# Patient Record
Sex: FEMALE | Race: BLACK OR AFRICAN AMERICAN | Employment: FULL TIME | URBAN - METROPOLITAN AREA
[De-identification: names, ages, dates, MRNs, and addresses within clinical notes are randomized per-mention and may not be internally consistent; named-entity substitution may affect disease eponyms.]

---

## 2023-09-26 PROBLEM — L28.0 LICHEN SIMPLEX CHRONICUS: Status: ACTIVE | Noted: 2021-12-21

## 2023-09-26 PROBLEM — I10 BENIGN HYPERTENSION: Status: ACTIVE | Noted: 2023-09-26

## 2023-09-26 PROBLEM — M20.5X2 CONTRACTURE OF TOE OF LEFT FOOT: Status: ACTIVE | Noted: 2023-09-26

## 2023-09-26 PROBLEM — R20.8 BURNING SENSATION OF FOOT: Status: ACTIVE | Noted: 2023-09-26

## 2023-09-26 PROBLEM — H04.123 DRY EYES: Status: ACTIVE | Noted: 2023-09-26

## 2023-09-26 PROBLEM — N39.0 UTI (URINARY TRACT INFECTION): Status: ACTIVE | Noted: 2023-09-26

## 2023-09-26 PROBLEM — L21.8 OTHER SEBORRHEIC DERMATITIS: Status: ACTIVE | Noted: 2021-12-21

## 2023-09-26 PROBLEM — L66.9 CICATRICIAL ALOPECIA, UNSPECIFIED: Status: ACTIVE | Noted: 2021-12-21

## 2023-09-26 PROBLEM — N32.89 BLADDER WALL THICKENING: Status: ACTIVE | Noted: 2023-09-26

## 2023-09-26 PROBLEM — B35.1 TINEA UNGUIUM: Status: ACTIVE | Noted: 2021-12-21

## 2023-09-26 PROBLEM — R39.81 FUNCTIONAL INCONTINENCE: Status: ACTIVE | Noted: 2023-09-26

## 2023-09-26 PROBLEM — L73.1 PSEUDOFOLLICULITIS BARBAE: Status: ACTIVE | Noted: 2021-12-21

## 2023-09-26 PROBLEM — H52.00 HYPEROPIA WITH PRESBYOPIA: Status: ACTIVE | Noted: 2023-09-26

## 2023-09-26 PROBLEM — N39.41 URGE INCONTINENCE OF URINE: Status: ACTIVE | Noted: 2023-09-26

## 2023-09-26 PROBLEM — H52.203 ASTIGMATISM OF BOTH EYES: Status: ACTIVE | Noted: 2023-09-26

## 2023-09-26 PROBLEM — E55.9 MILD VITAMIN D DEFICIENCY: Status: ACTIVE | Noted: 2023-09-26

## 2023-09-26 PROBLEM — D22.70 MELANOCYTIC NEVI OF UNSPECIFIED LOWER LIMB, INCLUDING HIP: Status: ACTIVE | Noted: 2021-12-21

## 2023-09-26 PROBLEM — H04.123 DRY EYE SYNDROME OF BOTH LACRIMAL GLANDS: Status: ACTIVE | Noted: 2023-09-26

## 2023-09-26 PROBLEM — R32 URINARY INCONTINENCE IN FEMALE: Status: ACTIVE | Noted: 2023-09-26

## 2023-09-26 PROBLEM — H52.03 HYPEROPIA OF BOTH EYES: Status: ACTIVE | Noted: 2023-09-26

## 2023-09-26 PROBLEM — H52.4 HYPEROPIA WITH PRESBYOPIA: Status: ACTIVE | Noted: 2023-09-26

## 2023-10-12 ENCOUNTER — APPOINTMENT (OUTPATIENT)
Dept: OBSTETRICS AND GYNECOLOGY | Facility: CLINIC | Age: 63
End: 2023-10-12
Payer: COMMERCIAL

## 2023-10-12 RX ORDER — HYDROCORTISONE 25 MG/G
OINTMENT TOPICAL
COMMUNITY
Start: 2017-03-13

## 2023-10-12 RX ORDER — CICLOPIROX 80 MG/ML
SOLUTION TOPICAL
COMMUNITY
Start: 2022-12-28

## 2023-10-12 RX ORDER — CLOBETASOL PROPIONATE 0.5 MG/G
OINTMENT TOPICAL
COMMUNITY
Start: 2022-12-29

## 2023-10-12 RX ORDER — FLUOCINONIDE 0.5 MG/G
CREAM TOPICAL
COMMUNITY
Start: 2019-02-19

## 2023-10-20 ENCOUNTER — APPOINTMENT (OUTPATIENT)
Dept: RADIOLOGY | Facility: HOSPITAL | Age: 63
End: 2023-10-20
Payer: COMMERCIAL

## 2023-10-23 ENCOUNTER — APPOINTMENT (OUTPATIENT)
Dept: ORTHOPEDIC SURGERY | Facility: HOSPITAL | Age: 63
End: 2023-10-23

## 2023-10-23 ENCOUNTER — APPOINTMENT (OUTPATIENT)
Dept: RADIOLOGY | Facility: HOSPITAL | Age: 63
End: 2023-10-23

## 2023-11-06 ENCOUNTER — OFFICE VISIT (OUTPATIENT)
Dept: PRIMARY CARE | Facility: CLINIC | Age: 63
End: 2023-11-06
Payer: COMMERCIAL

## 2023-11-06 VITALS
SYSTOLIC BLOOD PRESSURE: 154 MMHG | DIASTOLIC BLOOD PRESSURE: 94 MMHG | OXYGEN SATURATION: 97 % | RESPIRATION RATE: 18 BRPM | BODY MASS INDEX: 26.46 KG/M2 | HEIGHT: 64 IN | WEIGHT: 155 LBS | HEART RATE: 68 BPM

## 2023-11-06 DIAGNOSIS — E55.9 MILD VITAMIN D DEFICIENCY: ICD-10-CM

## 2023-11-06 DIAGNOSIS — Z12.31 ENCOUNTER FOR SCREENING MAMMOGRAM FOR MALIGNANT NEOPLASM OF BREAST: ICD-10-CM

## 2023-11-06 DIAGNOSIS — Z12.12 ENCOUNTER FOR COLORECTAL CANCER SCREENING: ICD-10-CM

## 2023-11-06 DIAGNOSIS — Z12.11 ENCOUNTER FOR COLORECTAL CANCER SCREENING: ICD-10-CM

## 2023-11-06 DIAGNOSIS — R39.81 FUNCTIONAL INCONTINENCE: ICD-10-CM

## 2023-11-06 DIAGNOSIS — I10 BENIGN HYPERTENSION: Primary | ICD-10-CM

## 2023-11-06 PROBLEM — L30.9 ECZEMA: Status: ACTIVE | Noted: 2023-11-06

## 2023-11-06 PROBLEM — R80.9 MICROALBUMINURIA: Status: ACTIVE | Noted: 2023-11-06

## 2023-11-06 PROBLEM — N32.81 OAB (OVERACTIVE BLADDER): Status: ACTIVE | Noted: 2019-11-20

## 2023-11-06 PROCEDURE — 3080F DIAST BP >= 90 MM HG: CPT

## 2023-11-06 PROCEDURE — 1036F TOBACCO NON-USER: CPT

## 2023-11-06 PROCEDURE — 99214 OFFICE O/P EST MOD 30 MIN: CPT

## 2023-11-06 PROCEDURE — 3077F SYST BP >= 140 MM HG: CPT

## 2023-11-06 RX ORDER — HYDROCHLOROTHIAZIDE 25 MG/1
25 TABLET ORAL DAILY
Qty: 90 TABLET | Refills: 3 | Status: SHIPPED | OUTPATIENT
Start: 2023-11-06

## 2023-11-06 RX ORDER — CALCIUM CARBONATE 750 MG/1
1 TABLET, CHEWABLE ORAL DAILY
Qty: 1 KIT | Refills: 0 | Status: SHIPPED | OUTPATIENT
Start: 2023-11-06

## 2023-11-06 RX ORDER — AMLODIPINE BESYLATE 5 MG/1
5 TABLET ORAL DAILY
Qty: 90 TABLET | Refills: 3 | Status: SHIPPED | OUTPATIENT
Start: 2023-11-06

## 2023-11-06 ASSESSMENT — ENCOUNTER SYMPTOMS
DIFFICULTY URINATING: 0
SLEEP DISTURBANCE: 0
FATIGUE: 0
DYSPHORIC MOOD: 0
SPEECH DIFFICULTY: 0
ANAL BLEEDING: 0
DIARRHEA: 0
COLOR CHANGE: 0
NEUROLOGICAL NEGATIVE: 1
CHILLS: 0
NAUSEA: 0
NUMBNESS: 0
GASTROINTESTINAL NEGATIVE: 1
PALPITATIONS: 0
HEMATURIA: 0
DIZZINESS: 0
CONFUSION: 0
POLYPHAGIA: 0
AGITATION: 0
RHINORRHEA: 0
RECTAL PAIN: 0
CONSTITUTIONAL NEGATIVE: 1
COUGH: 0
HEADACHES: 0
SHORTNESS OF BREATH: 0
BACK PAIN: 0
APPETITE CHANGE: 0
JOINT SWELLING: 0
DYSURIA: 0
EYES NEGATIVE: 1
SEIZURES: 0
SINUS PRESSURE: 0
WEAKNESS: 0
VOICE CHANGE: 0
NECK STIFFNESS: 0
DIAPHORESIS: 0
UNEXPECTED WEIGHT CHANGE: 0
ABDOMINAL DISTENTION: 0
ABDOMINAL PAIN: 0
CHEST TIGHTNESS: 0
FREQUENCY: 1
POLYDIPSIA: 0
PSYCHIATRIC NEGATIVE: 1
WHEEZING: 0
NERVOUS/ANXIOUS: 0
LIGHT-HEADEDNESS: 0
RESPIRATORY NEGATIVE: 1
ARTHRALGIAS: 1
FLANK PAIN: 0
HEMATOLOGIC/LYMPHATIC NEGATIVE: 1
TROUBLE SWALLOWING: 0
BLOOD IN STOOL: 0
ENDOCRINE NEGATIVE: 1
APNEA: 0
EYE DISCHARGE: 0
MYALGIAS: 0
CARDIOVASCULAR NEGATIVE: 1
TREMORS: 0
PHOTOPHOBIA: 0
STRIDOR: 0
BRUISES/BLEEDS EASILY: 0
FEVER: 0
CONSTIPATION: 0
HYPERACTIVE: 0
NECK PAIN: 0
SORE THROAT: 0
ACTIVITY CHANGE: 0

## 2023-11-06 NOTE — PROGRESS NOTES
Primary Care Provider: Leena Pack, APRN-CNP    Subjective   Camila Becker is a 63 y.o. female who presents for Follow-up (Bp medication).    History of high blood pressure and needs medication refilled.    Has not taken any BP medication for sometime now as she has been out of medication  HCTZ- usually takes this medication, has been out for a while now  Amlodipine- out for about 2 months     Some Right IT band pain; swimming helps pain     Function incontinence  Has been going on for 3.5 years now. Saw urology once.    No chest pain, no SOB, BM regular, energy level is good         Review of Systems   Constitutional: Negative.  Negative for activity change, appetite change, chills, diaphoresis, fatigue, fever and unexpected weight change.   HENT: Negative.  Negative for congestion, dental problem, ear discharge, ear pain, hearing loss, mouth sores, nosebleeds, postnasal drip, rhinorrhea, sinus pressure, sneezing, sore throat, tinnitus, trouble swallowing and voice change.    Eyes: Negative.  Negative for photophobia, discharge and visual disturbance.   Respiratory: Negative.  Negative for apnea, cough, chest tightness, shortness of breath, wheezing and stridor.    Cardiovascular: Negative.  Negative for chest pain, palpitations and leg swelling.   Gastrointestinal: Negative.  Negative for abdominal distention, abdominal pain, anal bleeding, blood in stool, constipation, diarrhea, nausea and rectal pain.   Endocrine: Negative.  Negative for cold intolerance, heat intolerance, polydipsia, polyphagia and polyuria.   Genitourinary:  Positive for frequency. Negative for decreased urine volume, difficulty urinating, dyspareunia, dysuria, enuresis, flank pain, genital sores, hematuria, menstrual problem, urgency, vaginal bleeding, vaginal discharge and vaginal pain.   Musculoskeletal:  Positive for arthralgias. Negative for back pain, gait problem, joint swelling, myalgias, neck pain and neck stiffness.   Skin:  "Negative.  Negative for color change and rash.   Neurological: Negative.  Negative for dizziness, tremors, seizures, syncope, speech difficulty, weakness, light-headedness, numbness and headaches.   Hematological: Negative.  Does not bruise/bleed easily.   Psychiatric/Behavioral: Negative.  Negative for agitation, confusion, dysphoric mood, sleep disturbance and suicidal ideas. The patient is not nervous/anxious and is not hyperactive.    All other systems reviewed and are negative.        Objective   BP (!) 154/94   Pulse 68   Resp 18   Ht 1.626 m (5' 4\")   Wt 70.3 kg (155 lb)   SpO2 97%   BMI 26.61 kg/m²     Physical Exam  Vitals reviewed.   Constitutional:       General: She is not in acute distress.     Appearance: Normal appearance. She is normal weight. She is not ill-appearing, toxic-appearing or diaphoretic.   HENT:      Head: Normocephalic and atraumatic.      Nose: Nose normal.   Eyes:      Extraocular Movements: Extraocular movements intact.      Conjunctiva/sclera: Conjunctivae normal.      Pupils: Pupils are equal, round, and reactive to light.   Cardiovascular:      Rate and Rhythm: Normal rate and regular rhythm.      Pulses: Normal pulses.      Heart sounds: Normal heart sounds. No murmur heard.     No friction rub. No gallop.   Pulmonary:      Effort: Pulmonary effort is normal. No respiratory distress.      Breath sounds: Normal breath sounds.   Abdominal:      General: Abdomen is flat. Bowel sounds are normal.      Palpations: Abdomen is soft.   Musculoskeletal:         General: Normal range of motion.      Cervical back: Normal range of motion and neck supple.   Skin:     General: Skin is warm and dry.      Capillary Refill: Capillary refill takes less than 2 seconds.   Neurological:      General: No focal deficit present.      Mental Status: She is alert and oriented to person, place, and time. Mental status is at baseline.   Psychiatric:         Mood and Affect: Mood normal.         " Behavior: Behavior normal.         Thought Content: Thought content normal.         Judgment: Judgment normal.         Assessment/Plan   Problem List Items Addressed This Visit       Hypertension  Uncontrolled  Restart medication- amlodipine, hydrochlorothiazide   -refilled HCTZ 25mg & amlodipine 5mg that she had been previous taking  -recheck labs: lipid panel, CMP, CBC, TSH, UA, A1c  would like her to start checking BP daily     Functional incontinence - Primary  Labs & UA    Mild vitamin D deficiency     Other Visit Diagnoses       Encounter for colorectal cancer screening        Encounter for screening mammogram for malignant neoplasm of breast            Mammogram ordered    Colonoscopy ordered    Blood work ordered- fasting    Get back in with OB/GYN    Get back in with Urology      Follow up in 4-6 weeks or sooner if needed

## 2023-11-07 ENCOUNTER — TELEPHONE (OUTPATIENT)
Dept: PRIMARY CARE | Facility: CLINIC | Age: 63
End: 2023-11-07
Payer: COMMERCIAL

## 2023-11-07 NOTE — TELEPHONE ENCOUNTER
Called the pt to inform of the denial of prior auth for self-taking blood pressure kit. Also stated I would inform of the prior auth for hydroCHLOROthiazide once I received response from insurance.

## 2024-01-18 ENCOUNTER — ANCILLARY PROCEDURE (OUTPATIENT)
Dept: RADIOLOGY | Facility: CLINIC | Age: 64
End: 2024-01-18
Payer: COMMERCIAL

## 2024-01-18 VITALS — BODY MASS INDEX: 26.46 KG/M2 | WEIGHT: 154.98 LBS | HEIGHT: 64 IN

## 2024-01-18 DIAGNOSIS — Z12.31 ENCOUNTER FOR SCREENING MAMMOGRAM FOR MALIGNANT NEOPLASM OF BREAST: ICD-10-CM

## 2024-01-18 PROCEDURE — 77067 SCR MAMMO BI INCL CAD: CPT | Performed by: RADIOLOGY

## 2024-01-18 PROCEDURE — 77063 BREAST TOMOSYNTHESIS BI: CPT | Performed by: RADIOLOGY

## 2024-01-18 PROCEDURE — 77063 BREAST TOMOSYNTHESIS BI: CPT

## 2024-01-22 ENCOUNTER — TELEPHONE (OUTPATIENT)
Dept: PRIMARY CARE | Facility: CLINIC | Age: 64
End: 2024-01-22
Payer: COMMERCIAL

## 2024-01-22 DIAGNOSIS — N63.10 MASS OF RIGHT BREAST, UNSPECIFIED QUADRANT: Primary | ICD-10-CM

## 2024-01-22 NOTE — TELEPHONE ENCOUNTER
Mailbox was full left a SMS notification with our number [phone].    Per TRUDY Leena Abnormal mammogram; right breast mass: Right breast US and right breast diagnostic mammogram ordered. Referral to breast clinic placed.

## 2024-01-25 ENCOUNTER — HOSPITAL ENCOUNTER (OUTPATIENT)
Dept: RADIOLOGY | Facility: CLINIC | Age: 64
Discharge: HOME | End: 2024-01-25
Payer: COMMERCIAL

## 2024-01-25 DIAGNOSIS — N63.10 MASS OF RIGHT BREAST, UNSPECIFIED QUADRANT: ICD-10-CM

## 2024-01-25 PROCEDURE — 77061 BREAST TOMOSYNTHESIS UNI: CPT | Mod: RT

## 2024-01-25 PROCEDURE — 76642 ULTRASOUND BREAST LIMITED: CPT | Mod: RT

## 2024-01-25 PROCEDURE — 76982 USE 1ST TARGET LESION: CPT | Mod: RT

## 2024-01-25 PROCEDURE — 76642 ULTRASOUND BREAST LIMITED: CPT | Mod: RIGHT SIDE | Performed by: RADIOLOGY

## 2024-01-25 PROCEDURE — 77061 BREAST TOMOSYNTHESIS UNI: CPT | Mod: RIGHT SIDE | Performed by: RADIOLOGY

## 2024-01-25 PROCEDURE — 77065 DX MAMMO INCL CAD UNI: CPT | Mod: RIGHT SIDE | Performed by: RADIOLOGY

## 2024-01-26 ENCOUNTER — TELEPHONE (OUTPATIENT)
Dept: PRIMARY CARE | Facility: CLINIC | Age: 64
End: 2024-01-26
Payer: COMMERCIAL

## 2024-01-26 DIAGNOSIS — N60.01 CYST OF RIGHT BREAST: Primary | ICD-10-CM

## 2024-01-26 NOTE — TELEPHONE ENCOUNTER
Per TRUDY Stern... Informed Patient probable complicated cyst in the right breast Plan: breast clinic referral 6 month follow up with repeat ultrasound

## 2024-07-23 NOTE — PROGRESS NOTES
Camila Becker female   1960 64 y.o.   [id number]      Chief Complaint  Right breast mass    History Of Present Illness  Camila Becker is a very pleasant 64 y.o. AA female [facility] by Leena Pack for right breast mass noted on mammogram in 2024. She has family history of breast cancer in her sister, age 45. She denies breast surgery or biopsy.    BREAST IMAGIN2024 Bilateral screening mammogram, indicates BI-RADS Category 0. Right breast mass warranting additional views. 2024 Right diagnostic mammogram and ultrasound, indicates BI-RADS Category 3. Right breast, probably benign mass warranting short term follow up.     REPRODUCTIVE HISTORY: menarche age 13, , first birth age 27, did not breastfeed, OCP's x 5 years, postmenopausal, no HRT, scattered fibroglandular tissue    FAMILY CANCER HISTORY:   Sister: Breast cancer, age 45, NO genetics     Surgical History  She has a past surgical history that includes Other surgical history (2022).     Social History  She reports that she has never smoked. She has never used smokeless tobacco. She reports current alcohol use. She reports that she does not use drugs.    Family History  Family History   Problem Relation Name Age of Onset    Diabetes Father      Hypertension Father      Breast cancer Sister          Allergies  Patient has no known allergies.    Medications  Current Outpatient Medications   Medication Instructions    amLODIPine (NORVASC) 5 mg, oral, Daily    blood pressure test kit-medium kit 1 kit, miscellaneous, Daily    ciclopirox (Penlac) 8 % solution     clobetasol (Temovate) 0.05 % ointment     fluocinonide 0.05 % cream 1 Application    hydroCHLOROthiazide (HYDRODIURIL) 25 mg, oral, Daily    hydrocortisone 2.5 % ointment Hydrocortisone 2.5 % External Ointment   Quantity: 56  Refills: 0        Start : 13-Mar-2017   Active    oxybutynin XL (Ditropan-XL) 10 mg 24 hr tablet 1 tablet, oral, Daily          REVIEW OF SYSTEMS    Constitutional:  Negative for appetite change, fatigue, fever and unexpected weight change.   HENT:  Negative for ear pain, hearing loss, nosebleeds, sore throat and trouble swallowing.    Eyes:  Negative for discharge, itching and visual disturbance.   Respiratory:  Negative for cough, chest tightness and shortness of breath.    Cardiovascular:  Negative for chest pain, palpitations and leg swelling.   Breast: as indicated in HPI  Gastrointestinal:  Negative for abdominal pain, constipation, diarrhea and nausea.   Endocrine: Negative for cold intolerance and heat intolerance.   Genitourinary:  Negative for dysuria, frequency, hematuria, pelvic pain and vaginal bleeding.   Musculoskeletal:  Negative for arthralgias, back pain, gait problem, joint swelling and myalgias.   Skin:  Negative for color change and rash.   Allergic/Immunologic: Negative for environmental allergies and food allergies.   Neurological:  Negative for dizziness, tremors, speech difficulty, weakness, numbness and headaches.   Hematological:  Does not bruise/bleed easily.   Psychiatric/Behavioral:  Negative for agitation, dysphoric mood and sleep disturbance. The patient is not nervous/anxious.         Past Medical History  She has a past medical history of Personal history of other diseases of the circulatory system.     Physical Exam  Patient is alert and oriented x3 and in a relaxed and appropriate mood. Her gait is steady and hand grasps are equal. Sclera is clear. The breasts are nearly symmetrical. The tissue is soft without palpable abnormalities, discrete nodules or masses. The skin and nipples appear normal. Both nipples are inverted, normal per patient report. There is no cervical, supraclavicular or axillary lymphadenopathy. Heart rate and rhythm normal, S1 and S2 appreciated. The lungs are clear to auscultation bilaterally. Abdomen is soft and non-tender.       Physical Exam     Last Recorded  Vitals  Vitals:    08/01/24 0909   BP: 122/72   Pulse: 70   SpO2: 99%       Relevant Results   Time was spent discussing digital images of the radiology testing with the patient. I explained the results in depth, along with suggested explanation for follow up recommendations based on the testing results. BI-RADS Category 2    Imaging    Narrative & Impression   Interpreted By:  Thu Barron,   STUDY:  BI US BREAST LIMITED RIGHT;  8/1/2024 9:45 am      ACCESSION NUMBER(S):  [id number]      ORDERING CLINICIAN:  PAUL RODRIGUEZ      INDICATION:  Short-term follow-up of a probably benign mass in the right breast.      COMPARISON:  01/25/2024.      FINDINGS:  ULTRASOUND: Targeted ultrasound was performed of the right breast by  a registered sonographer with elastography. At 3 o'clock 3 cm from  the nipple, there is redemonstration of an oval circumscribed  hypoechoic mass, which now measures 0.3 x 0.1 x 0.3 cm, previously  0.5 x 0.2 x 0.3 cm. There is no internal vascularity. It is soft on  elastography. Given the interval decrease in size, this is most  consistent with a benign etiology. Further sonographic evaluation of  the remainder of the medial breast demonstrates unremarkable  fibroglandular tissue.      IMPRESSION:  Interval decrease in size of the previously seen finding in the right  breast, consistent with a benign etiology. No further follow-up is  indicated. Patient is due for annual bilateral screening mammogram  01/2025.      BI-RADS CATEGORY:  BI-RADS Category:  2 Benign.  Recommendation:  Annual Screening.  Recommended Date:  5 Months.  Laterality:  Bilateral.       Assessment/Plan   Stable clinical exam and imaging, right breast stable mass, family history of breast cancer, scattered fibroglandular tissue    Plan: Return in January 2025 for bilateral screening mammogram and office visit. High risk evaluation at next visit.     Patient Discussion/Summary  Your clinical examination and imaging are  normal. Please return in January 2025 for bilateral screening mammogram and office visit or sooner if you have any problems or concerns. We will discuss high risk evaluation at your next visit.     You can see your health information, review clinical summaries from office visits & test results online when you follow your health with MY  Chart, a personal health record. To sign up go to [email or web address]. If you need assistance with signing up or trouble getting into your account call [employer] Patient Line 24/7 at [phone].    My office phone number is [phone] if you need to get in touch with me or have additional questions or concerns. Thank you for choosing [facility] and trusting me as your healthcare provider. I look forward to seeing you again at your next office visit. I am honored to be a provider on your health care team and I remain dedicated to helping you achieve your health goals.      Jackie Rainey, APRN-CNP

## 2024-08-01 ENCOUNTER — OFFICE VISIT (OUTPATIENT)
Dept: SURGICAL ONCOLOGY | Facility: CLINIC | Age: 64
End: 2024-08-01
Payer: COMMERCIAL

## 2024-08-01 ENCOUNTER — HOSPITAL ENCOUNTER (OUTPATIENT)
Dept: RADIOLOGY | Facility: CLINIC | Age: 64
Discharge: HOME | End: 2024-08-01
Payer: COMMERCIAL

## 2024-08-01 VITALS
BODY MASS INDEX: 25.95 KG/M2 | OXYGEN SATURATION: 99 % | SYSTOLIC BLOOD PRESSURE: 122 MMHG | WEIGHT: 151.24 LBS | DIASTOLIC BLOOD PRESSURE: 72 MMHG | HEART RATE: 70 BPM

## 2024-08-01 DIAGNOSIS — N63.10 UNSPECIFIED LUMP IN THE RIGHT BREAST, UNSPECIFIED QUADRANT: ICD-10-CM

## 2024-08-01 DIAGNOSIS — N63.10 MASS OF RIGHT BREAST, UNSPECIFIED QUADRANT: ICD-10-CM

## 2024-08-01 DIAGNOSIS — N63.10 MASS OF RIGHT BREAST: ICD-10-CM

## 2024-08-01 DIAGNOSIS — Z12.39 BREAST CANCER SCREENING, HIGH RISK PATIENT: ICD-10-CM

## 2024-08-01 DIAGNOSIS — R92.8 ABNORMAL MAMMOGRAM: Primary | ICD-10-CM

## 2024-08-01 PROCEDURE — 3074F SYST BP LT 130 MM HG: CPT | Performed by: NURSE PRACTITIONER

## 2024-08-01 PROCEDURE — 99203 OFFICE O/P NEW LOW 30 MIN: CPT | Performed by: NURSE PRACTITIONER

## 2024-08-01 PROCEDURE — 76642 ULTRASOUND BREAST LIMITED: CPT | Mod: RT

## 2024-08-01 PROCEDURE — 1126F AMNT PAIN NOTED NONE PRSNT: CPT | Performed by: NURSE PRACTITIONER

## 2024-08-01 PROCEDURE — 1036F TOBACCO NON-USER: CPT | Performed by: NURSE PRACTITIONER

## 2024-08-01 PROCEDURE — 76642 ULTRASOUND BREAST LIMITED: CPT | Mod: RIGHT SIDE | Performed by: STUDENT IN AN ORGANIZED HEALTH CARE EDUCATION/TRAINING PROGRAM

## 2024-08-01 PROCEDURE — 76982 USE 1ST TARGET LESION: CPT | Mod: RT

## 2024-08-01 PROCEDURE — 99213 OFFICE O/P EST LOW 20 MIN: CPT | Performed by: NURSE PRACTITIONER

## 2024-08-01 PROCEDURE — 3078F DIAST BP <80 MM HG: CPT | Performed by: NURSE PRACTITIONER

## 2024-08-01 ASSESSMENT — PAIN SCALES - GENERAL: PAINLEVEL: 0-NO PAIN

## 2024-08-01 NOTE — PATIENT INSTRUCTIONS
Your clinical examination and imaging are normal. Please return in January 2025 for bilateral screening mammogram and office visit or sooner if you have any problems or concerns. We will discuss high risk evaluation at your next visit.     You can see your health information, review clinical summaries from office visits & test results online when you follow your health with MY  Chart, a personal health record. To sign up go to [email or web address]. If you need assistance with signing up or trouble getting into your account call [employer] Patient Line 24/7 at [phone].    My office phone number is [phone] if you need to get in touch with me or have additional questions or concerns. Thank you for choosing [facility] and trusting me as your healthcare provider. I look forward to seeing you again at your next office visit. I am honored to be a provider on your health care team and I remain dedicated to helping you achieve your health goals.

## 2024-12-02 ENCOUNTER — TELEPHONE (OUTPATIENT)
Dept: PRIMARY CARE | Facility: CLINIC | Age: 64
End: 2024-12-02
Payer: COMMERCIAL

## 2024-12-02 NOTE — TELEPHONE ENCOUNTER
I called the PT and no answer to schedule her a BP check her last appts have been cancelled please advise

## 2025-01-23 NOTE — PROGRESS NOTES
Camila Becker female   1960 64 y.o.   [id number]      Chief Complaint  High risk evaluation, annual mammogram and exam    History Of Present Illness  Camila Becker is a very pleasant 64 y.o. AA female [facility] for high risk evaluation and annual mammogram. She has family history of breast cancer in her sister, age 45. She denies breast surgery or biopsy.    BREAST IMAGIN2024 Bilateral screening mammogram, indicates BI-RADS Category 0. Right breast mass warranting additional views. 2024 Right diagnostic mammogram and ultrasound, indicates BI-RADS Category 3. Right breast, probably benign mass warranting short term follow up. 2024 Right breast ultrasound, indicates BI-RADS Category 2.     REPRODUCTIVE HISTORY: menarche age 13, , first birth age 27, did not breastfeed, OCP's x 5 years, postmenopausal, no HRT, scattered fibroglandular tissue    FAMILY CANCER HISTORY:   Sister: Breast cancer, age 45, NO genetics     Surgical History  She has a past surgical history that includes Other surgical history (2022).     Social History  She reports that she has never smoked. She has never used smokeless tobacco. She reports current alcohol use. She reports that she does not use drugs.    Family History  Family History   Problem Relation Name Age of Onset    Diabetes Father      Hypertension Father      Breast cancer Sister          Allergies  Patient has no known allergies.    Medications  Current Outpatient Medications   Medication Instructions    amLODIPine (NORVASC) 5 mg, oral, Daily    blood pressure test kit-medium kit 1 kit, miscellaneous, Daily    ciclopirox (Penlac) 8 % solution     clobetasol (Temovate) 0.05 % ointment     fluocinonide 0.05 % cream 1 Application    hydroCHLOROthiazide (HYDRODIURIL) 25 mg, oral, Daily    hydrocortisone 2.5 % ointment Hydrocortisone 2.5 % External Ointment   Quantity: 56  Refills: 0        Start : 13-Mar-2017   Active     oxybutynin XL (Ditropan-XL) 10 mg 24 hr tablet 1 tablet, Daily         REVIEW OF SYSTEMS    Constitutional:  Negative for appetite change, fatigue, fever and unexpected weight change.   HENT:  Negative for ear pain, hearing loss, nosebleeds, sore throat and trouble swallowing.    Eyes:  Negative for discharge, itching and visual disturbance.   Respiratory:  Negative for cough, chest tightness and shortness of breath.    Cardiovascular:  Negative for chest pain, palpitations and leg swelling.   Breast: as indicated in HPI  Gastrointestinal:  Negative for abdominal pain, constipation, diarrhea and nausea.   Endocrine: Negative for cold intolerance and heat intolerance.   Genitourinary:  Negative for dysuria, frequency, hematuria, pelvic pain and vaginal bleeding.   Musculoskeletal:  Negative for arthralgias, back pain, gait problem, joint swelling and myalgias.   Skin:  Negative for color change and rash.   Allergic/Immunologic: Negative for environmental allergies and food allergies.   Neurological:  Negative for dizziness, tremors, speech difficulty, weakness, numbness and headaches.   Hematological:  Does not bruise/bleed easily.   Psychiatric/Behavioral:  Negative for agitation, dysphoric mood and sleep disturbance. The patient is not nervous/anxious.         Past Medical History  She has a past medical history of Personal history of other diseases of the circulatory system.     Physical Exam  Patient is alert and oriented x3 and in a relaxed and appropriate mood. Her gait is steady and hand grasps are equal. Sclera is clear. The breasts are nearly symmetrical. The tissue is soft without palpable abnormalities, discrete nodules or masses. The skin and nipples appear normal. Both nipples are inverted, normal per patient report with palpable nipple behind. There is no cervical, supraclavicular or axillary lymphadenopathy. Heart rate and rhythm normal, S1 and S2 appreciated. The lungs are clear to auscultation  bilaterally. Abdomen is soft and non-tender.       Physical Exam     Last Recorded Vitals  Vitals:    02/03/25 1342   BP: 102/70   Pulse: 87   Temp: 35.8 °C (96.5 °F)   SpO2: 100%       Risk Profile      Assessment/Plan   Normal clinical exam, screening mammogram, high risk surveillance care, family history of breast cancer, scattered fibroglandular tissue    Plan: Return in one year for bilateral screening mammogram and office visit. Does not meet high risk criteria, but will monitor annually with mammogram and exam due to family history. Fast MRI discussed, optional.     Patient Discussion/Summary  Your clinical examination is normal. Please return in one year for bilateral screening mammogram and office visit or sooner if you have any problems or concerns.    You can see your health information, review clinical summaries from office visits & test results online when you follow your health with MY  Chart, a personal health record. To sign up go to [email or web address]. If you need assistance with signing up or trouble getting into your account call [employer] Patient Line 24/7 at [phone].    My office phone number is [phone] if you need to get in touch with me or have additional questions or concerns. Thank you for choosing [facility] and trusting me as your healthcare provider. I look forward to seeing you again at your next office visit. I am honored to be a provider on your health care team and I remain dedicated to helping you achieve your health goals.      Jackie Rainey, APRN-CNP

## 2025-02-01 ENCOUNTER — APPOINTMENT (OUTPATIENT)
Dept: RADIOLOGY | Facility: CLINIC | Age: 65
End: 2025-02-01
Payer: COMMERCIAL

## 2025-02-03 ENCOUNTER — OFFICE VISIT (OUTPATIENT)
Dept: SURGICAL ONCOLOGY | Facility: CLINIC | Age: 65
End: 2025-02-03
Payer: COMMERCIAL

## 2025-02-03 ENCOUNTER — HOSPITAL ENCOUNTER (OUTPATIENT)
Dept: RADIOLOGY | Facility: CLINIC | Age: 65
Discharge: HOME | End: 2025-02-03
Payer: COMMERCIAL

## 2025-02-03 VITALS
DIASTOLIC BLOOD PRESSURE: 70 MMHG | OXYGEN SATURATION: 100 % | SYSTOLIC BLOOD PRESSURE: 102 MMHG | TEMPERATURE: 96.5 F | WEIGHT: 152.45 LBS | HEART RATE: 87 BPM | BODY MASS INDEX: 26.15 KG/M2

## 2025-02-03 VITALS — WEIGHT: 152.56 LBS | HEIGHT: 64 IN | BODY MASS INDEX: 26.05 KG/M2

## 2025-02-03 DIAGNOSIS — Z12.39 BREAST CANCER SCREENING, HIGH RISK PATIENT: ICD-10-CM

## 2025-02-03 DIAGNOSIS — Z80.3 FAMILY HISTORY OF BREAST CANCER: Primary | ICD-10-CM

## 2025-02-03 PROCEDURE — 1126F AMNT PAIN NOTED NONE PRSNT: CPT | Performed by: NURSE PRACTITIONER

## 2025-02-03 PROCEDURE — 3078F DIAST BP <80 MM HG: CPT | Performed by: NURSE PRACTITIONER

## 2025-02-03 PROCEDURE — 77067 SCR MAMMO BI INCL CAD: CPT

## 2025-02-03 PROCEDURE — 99213 OFFICE O/P EST LOW 20 MIN: CPT | Performed by: NURSE PRACTITIONER

## 2025-02-03 PROCEDURE — 77067 SCR MAMMO BI INCL CAD: CPT | Performed by: STUDENT IN AN ORGANIZED HEALTH CARE EDUCATION/TRAINING PROGRAM

## 2025-02-03 PROCEDURE — 77063 BREAST TOMOSYNTHESIS BI: CPT | Performed by: STUDENT IN AN ORGANIZED HEALTH CARE EDUCATION/TRAINING PROGRAM

## 2025-02-03 PROCEDURE — 1036F TOBACCO NON-USER: CPT | Performed by: NURSE PRACTITIONER

## 2025-02-03 PROCEDURE — 3074F SYST BP LT 130 MM HG: CPT | Performed by: NURSE PRACTITIONER

## 2025-02-03 ASSESSMENT — PAIN SCALES - GENERAL: PAINLEVEL_OUTOF10: 0-NO PAIN

## 2025-02-03 ASSESSMENT — PATIENT HEALTH QUESTIONNAIRE - PHQ9
SUM OF ALL RESPONSES TO PHQ9 QUESTIONS 1 & 2: 0
2. FEELING DOWN, DEPRESSED OR HOPELESS: NOT AT ALL
1. LITTLE INTEREST OR PLEASURE IN DOING THINGS: NOT AT ALL

## 2025-02-03 NOTE — PATIENT INSTRUCTIONS
Your clinical examination is normal. Please return in one year for bilateral screening mammogram and office visit or sooner if you have any problems or concerns.    You can see your health information, review clinical summaries from office visits & test results online when you follow your health with MY  Chart, a personal health record. To sign up go to [email or web address]. If you need assistance with signing up or trouble getting into your account call [email or web address] Patient Line 24/7 at [phone].    My office phone number is [phone] if you need to get in touch with me or have additional questions or concerns. Thank you for choosing [facility] and trusting me as your healthcare provider. I look forward to seeing you again at your next office visit. I am honored to be a provider on your health care team and I remain dedicated to helping you achieve your health goals.

## 2025-02-10 ENCOUNTER — TELEPHONE (OUTPATIENT)
Dept: PRIMARY CARE | Facility: CLINIC | Age: 65
End: 2025-02-10
Payer: COMMERCIAL

## 2025-02-10 DIAGNOSIS — I10 BENIGN HYPERTENSION: ICD-10-CM

## 2025-02-10 NOTE — TELEPHONE ENCOUNTER
PT stated that she needs a refill on her BP medication she scheduled an appt for feb 21. PT would like to know if she can get a short supply until her appointment

## 2025-02-11 RX ORDER — AMLODIPINE BESYLATE 5 MG/1
5 TABLET ORAL DAILY
Qty: 30 TABLET | Refills: 0 | Status: SHIPPED | OUTPATIENT
Start: 2025-02-11 | End: 2025-03-13

## 2025-02-11 NOTE — TELEPHONE ENCOUNTER
Sent 30 day supply of amlodipine 5mg daily  Needs apt for refills. Last saw 11/2023.  Is scheduled for 2/21/25

## 2025-02-21 ENCOUNTER — APPOINTMENT (OUTPATIENT)
Dept: PRIMARY CARE | Facility: CLINIC | Age: 65
End: 2025-02-21
Payer: COMMERCIAL

## 2025-02-21 VITALS
HEART RATE: 84 BPM | DIASTOLIC BLOOD PRESSURE: 81 MMHG | SYSTOLIC BLOOD PRESSURE: 118 MMHG | HEIGHT: 64 IN | BODY MASS INDEX: 25.78 KG/M2 | OXYGEN SATURATION: 99 % | WEIGHT: 151 LBS

## 2025-02-21 DIAGNOSIS — J45.20 MILD INTERMITTENT ASTHMA WITHOUT COMPLICATION (HHS-HCC): ICD-10-CM

## 2025-02-21 DIAGNOSIS — Z12.11 ENCOUNTER FOR COLORECTAL CANCER SCREENING: ICD-10-CM

## 2025-02-21 DIAGNOSIS — I10 BENIGN HYPERTENSION: ICD-10-CM

## 2025-02-21 DIAGNOSIS — I10 PRIMARY HYPERTENSION: ICD-10-CM

## 2025-02-21 DIAGNOSIS — E55.9 VITAMIN D DEFICIENCY: ICD-10-CM

## 2025-02-21 DIAGNOSIS — Z00.00 HEALTHCARE MAINTENANCE: Primary | ICD-10-CM

## 2025-02-21 DIAGNOSIS — Z12.12 ENCOUNTER FOR COLORECTAL CANCER SCREENING: ICD-10-CM

## 2025-02-21 PROBLEM — N76.0 BACTERIAL VAGINOSIS: Status: RESOLVED | Noted: 2025-02-21 | Resolved: 2025-02-21

## 2025-02-21 PROBLEM — H04.19 DISORDER OF LACRIMAL GLAND: Status: ACTIVE | Noted: 2023-09-26

## 2025-02-21 PROBLEM — N32.89 HYPERTROPHY OF BLADDER: Status: ACTIVE | Noted: 2025-02-21

## 2025-02-21 PROBLEM — R20.2 PARESTHESIA OF FOOT: Status: ACTIVE | Noted: 2023-09-26

## 2025-02-21 PROBLEM — N39.0 UTI (URINARY TRACT INFECTION): Status: RESOLVED | Noted: 2023-09-26 | Resolved: 2025-02-21

## 2025-02-21 PROBLEM — N39.0 URINARY TRACT INFECTION: Status: RESOLVED | Noted: 2025-02-21 | Resolved: 2025-02-21

## 2025-02-21 PROBLEM — L70.9 ACNE: Status: RESOLVED | Noted: 2025-02-21 | Resolved: 2025-02-21

## 2025-02-21 PROBLEM — M20.5X9 CONTRACTURE OF TOE JOINT: Status: ACTIVE | Noted: 2023-09-26

## 2025-02-21 PROBLEM — L84 CALLUS OF FOOT: Status: ACTIVE | Noted: 2025-02-21

## 2025-02-21 PROBLEM — B96.89 BACTERIAL VAGINOSIS: Status: RESOLVED | Noted: 2025-02-21 | Resolved: 2025-02-21

## 2025-02-21 PROBLEM — L81.0 POSTINFLAMMATORY HYPERPIGMENTATION: Status: ACTIVE | Noted: 2021-12-21

## 2025-02-21 PROCEDURE — 3074F SYST BP LT 130 MM HG: CPT

## 2025-02-21 PROCEDURE — 1160F RVW MEDS BY RX/DR IN RCRD: CPT

## 2025-02-21 PROCEDURE — 99397 PER PM REEVAL EST PAT 65+ YR: CPT

## 2025-02-21 PROCEDURE — 1123F ACP DISCUSS/DSCN MKR DOCD: CPT

## 2025-02-21 PROCEDURE — 1036F TOBACCO NON-USER: CPT

## 2025-02-21 PROCEDURE — 3079F DIAST BP 80-89 MM HG: CPT

## 2025-02-21 PROCEDURE — 1158F ADVNC CARE PLAN TLK DOCD: CPT

## 2025-02-21 PROCEDURE — 1159F MED LIST DOCD IN RCRD: CPT

## 2025-02-21 PROCEDURE — 3008F BODY MASS INDEX DOCD: CPT

## 2025-02-21 RX ORDER — AMLODIPINE BESYLATE 5 MG/1
5 TABLET ORAL DAILY
Qty: 90 TABLET | Refills: 3 | Status: SHIPPED | OUTPATIENT
Start: 2025-02-21 | End: 2026-02-21

## 2025-02-21 RX ORDER — HYDROCHLOROTHIAZIDE 25 MG/1
25 TABLET ORAL DAILY
Qty: 90 TABLET | Refills: 3 | Status: SHIPPED | OUTPATIENT
Start: 2025-02-21

## 2025-02-21 RX ORDER — HYDROQUINONE 40 MG/G
CREAM TOPICAL
COMMUNITY
Start: 2024-11-26

## 2025-02-21 ASSESSMENT — ENCOUNTER SYMPTOMS
LOSS OF SENSATION IN FEET: 0
OCCASIONAL FEELINGS OF UNSTEADINESS: 0
DEPRESSION: 0

## 2025-02-21 ASSESSMENT — PATIENT HEALTH QUESTIONNAIRE - PHQ9
SUM OF ALL RESPONSES TO PHQ9 QUESTIONS 1 AND 2: 0
1. LITTLE INTEREST OR PLEASURE IN DOING THINGS: NOT AT ALL
2. FEELING DOWN, DEPRESSED OR HOPELESS: NOT AT ALL

## 2025-02-21 NOTE — PROGRESS NOTES
"Reason for Visit: Annual Physical Exam    HPI:  HPI    Health Maintenance    No symptoms, feeling well    HTN    Vit d def    Asthma- no symptoms    Healthcare POA- is her mom Kyung Morejon    Mammogram: due 2/2026  Due to see OB/GYN  Colonoscopy: due  Lung cancer screening: denies any smoking hx  Hgb A1c:   Lab Results   Component Value Date    HGBA1C 5.6 05/02/2022     Cholesterol panel:   Lab Results   Component Value Date    CHOL 178 05/02/2022    CHOL 175 02/18/2019     Lab Results   Component Value Date    HDL 73.1 05/02/2022    HDL 75.4 02/18/2019      No results found for: \"LDLCALC\"  Lab Results   Component Value Date    TRIG 146 05/02/2022    TRIG 155 (H) 02/18/2019     No components found for: \"CHOLHDL\"  Depression PHQ-2: 0  TDAP: UTD  Influenza vaccine: due  Eye Examinations: due  Dental Examinations: UTD  Exercises regularly- 5 days a week; 150mins plus a week  Diet well balanced      Active Problem List  Patient Active Problem List   Diagnosis    Astigmatism of both eyes    Benign hypertension    Bladder wall thickening    Paresthesia of foot    Cicatricial alopecia, unspecified    Contracture of toe joint    Disorder of lacrimal gland    Dry eyes    Functional incontinence    Urinary incontinence    Hyperopia of both eyes    Hyperopia    Lichen simplex chronicus    Melanocytic nevi of unspecified lower limb, including hip    Vitamin D deficiency    Other seborrheic dermatitis    Pseudofolliculitis barbae    Tinea unguium    Urge incontinence of urine    Microalbuminuria    OAB (overactive bladder)    Eczema    Mild intermittent asthma without complication ([facility])    Postinflammatory hyperpigmentation    Internal hemorrhoid    Insufficiency of tear film of both eyes    Glaucoma suspect, both eyes    Hypertrophy of bladder    Callus of foot    Anxiety disorder    Encounter for colorectal cancer screening       Comprehensive Medical/Surgical/Social/Family History  Past Medical History:   Diagnosis Date " "   Acne 2025    Onychomycosis 03/15/2005    DERMATOPHYTOSIS OF NAIL      Personal history of other diseases of the circulatory system     History of hypertension    Urinary tract infection 2025    UTI (urinary tract infection) 2023     Past Surgical History:   Procedure Laterality Date    OTHER SURGICAL HISTORY  2022     section     Social History     Social History Narrative    Not on file         Allergies and Medications  Patient has no known allergies.  Current Outpatient Medications on File Prior to Visit   Medication Sig Dispense Refill    hydroquinone 4 % cream APPLY TO DARK MARKS TWICE DAILY      blood pressure test kit-medium kit 1 kit once daily. 1 kit 0    ciclopirox (Penlac) 8 % solution       clobetasol (Temovate) 0.05 % ointment       fluocinonide 0.05 % cream 1 Application      hydrocortisone 2.5 % ointment Hydrocortisone 2.5 % External Ointment   Quantity: 56  Refills: 0        Start : 13-Mar-2017   Active      oxybutynin XL (Ditropan-XL) 10 mg 24 hr tablet Take 1 tablet (10 mg) by mouth once daily.      [DISCONTINUED] amLODIPine (Norvasc) 5 mg tablet Take 1 tablet (5 mg) by mouth once daily. 30 tablet 0    [DISCONTINUED] hydroCHLOROthiazide (HYDRODiuril) 25 mg tablet Take 1 tablet (25 mg) by mouth once daily. 90 tablet 3     No current facility-administered medications on file prior to visit.         ROS otherwise negative aside from what was mentioned above in HPI.  Review of Systems      Vitals  /81   Pulse 84   Ht 1.626 m (5' 4.02\")   Wt 68.5 kg (151 lb)   SpO2 99%   BMI 25.90 kg/m²   Body mass index is 25.9 kg/m².    Physical Exam  Physical Exam  Vitals reviewed.   Constitutional:       General: She is not in acute distress.     Appearance: Normal appearance. She is normal weight. She is not ill-appearing, toxic-appearing or diaphoretic.   HENT:      Head: Normocephalic and atraumatic.      Right Ear: Tympanic membrane, ear canal and external ear " normal. There is no impacted cerumen.      Left Ear: Tympanic membrane, ear canal and external ear normal. There is no impacted cerumen.      Nose: Nose normal.   Eyes:      Conjunctiva/sclera: Conjunctivae normal.   Cardiovascular:      Rate and Rhythm: Normal rate and regular rhythm.      Pulses: Normal pulses.      Heart sounds: Normal heart sounds. No murmur heard.     No friction rub. No gallop.   Pulmonary:      Effort: Pulmonary effort is normal. No respiratory distress.      Breath sounds: Normal breath sounds.   Abdominal:      General: Abdomen is flat. Bowel sounds are normal.      Palpations: Abdomen is soft.   Musculoskeletal:         General: Normal range of motion.      Cervical back: Normal range of motion and neck supple.   Lymphadenopathy:      Cervical: No cervical adenopathy.   Skin:     General: Skin is warm and dry.      Capillary Refill: Capillary refill takes less than 2 seconds.   Neurological:      General: No focal deficit present.      Mental Status: She is alert and oriented to person, place, and time. Mental status is at baseline.   Psychiatric:         Mood and Affect: Mood normal.         Behavior: Behavior normal.         Thought Content: Thought content normal.         Judgment: Judgment normal.           Assessment and Plan:  Problem List Items Addressed This Visit    CPE         ICD-10-CM    Benign hypertension I10    stable  Relevant Medications    amLODIPine (Norvasc) 5 mg tablet    hydroCHLOROthiazide (HYDRODiuril) 25 mg tablet    Other Relevant Orders    Hemoglobin A1C    Lipid Panel    CBC and Auto Differential    Comprehensive metabolic panel    Urinalysis with Reflex Microscopic    Vitamin D deficiency E55.9    Relevant Orders    Vitamin D 25-Hydroxy,Total (for eval of Vitamin D levels)    Mild intermittent asthma without complication ([facility]) J45.20    stable  Relevant Orders    Hemoglobin A1C    Lipid Panel    CBC and Auto Differential    Comprehensive metabolic panel     "Urinalysis with Reflex Microscopic    Encounter for colorectal cancer screening - Primary Z12.11, Z12.12    Relevant Orders    Colonoscopy Screening; Average Risk Patient     Healthcare POA- is her mom Kyung Morejon    Mammogram: due 2/2026  Due to see OB/GYN  Colonoscopy: due  Lung cancer screening: denies any smoking hx  Hgb A1c:   Lab Results   Component Value Date    HGBA1C 5.6 05/02/2022     Cholesterol panel:   Lab Results   Component Value Date    CHOL 178 05/02/2022    CHOL 175 02/18/2019     Lab Results   Component Value Date    HDL 73.1 05/02/2022    HDL 75.4 02/18/2019      No results found for: \"LDLCALC\"  Lab Results   Component Value Date    TRIG 146 05/02/2022    TRIG 155 (H) 02/18/2019     No components found for: \"CHOLHDL\"  Depression PHQ-2: 0  TDAP: UTD  Influenza vaccine: due  Eye Examinations: due  Dental Examinations: UTD  Exercises regularly- 5 days a week; 150mins plus a week  Diet well balanced      Encourage diet and exercise to maintain healthy lifestyle.     Follow up with yearly wellness exam and as needed    Leena Pack, APRN-CNP  "

## 2025-02-25 PROCEDURE — RXMED WILLOW AMBULATORY MEDICATION CHARGE

## 2025-02-28 ENCOUNTER — PHARMACY VISIT (OUTPATIENT)
Dept: PHARMACY | Facility: CLINIC | Age: 65
End: 2025-02-28
Payer: COMMERCIAL

## 2025-03-18 DIAGNOSIS — I10 BENIGN HYPERTENSION: ICD-10-CM

## 2025-03-18 RX ORDER — HYDROCHLOROTHIAZIDE 25 MG/1
25 TABLET ORAL DAILY
Qty: 90 TABLET | Refills: 3 | Status: SHIPPED | OUTPATIENT
Start: 2025-03-18

## 2025-03-18 RX ORDER — AMLODIPINE BESYLATE 5 MG/1
5 TABLET ORAL DAILY
Qty: 90 TABLET | Refills: 3 | Status: SHIPPED | OUTPATIENT
Start: 2025-03-18 | End: 2026-03-18

## 2025-03-28 LAB
25(OH)D3+25(OH)D2 SERPL-MCNC: 44 NG/ML (ref 30–100)
ALBUMIN SERPL-MCNC: 4.2 G/DL (ref 3.6–5.1)
ALP SERPL-CCNC: 82 U/L (ref 37–153)
ALT SERPL-CCNC: 14 U/L (ref 6–29)
ANION GAP SERPL CALCULATED.4IONS-SCNC: 7 MMOL/L (CALC) (ref 7–17)
APPEARANCE UR: CLEAR
AST SERPL-CCNC: 17 U/L (ref 10–35)
BACTERIA #/AREA URNS HPF: ABNORMAL /HPF
BASOPHILS # BLD AUTO: 52 CELLS/UL (ref 0–200)
BASOPHILS NFR BLD AUTO: 0.9 %
BILIRUB SERPL-MCNC: 0.4 MG/DL (ref 0.2–1.2)
BILIRUB UR QL STRIP: NEGATIVE
BUN SERPL-MCNC: 14 MG/DL (ref 7–25)
CALCIUM SERPL-MCNC: 9.5 MG/DL (ref 8.6–10.4)
CHLORIDE SERPL-SCNC: 104 MMOL/L (ref 98–110)
CHOLEST SERPL-MCNC: 168 MG/DL
CHOLEST/HDLC SERPL: 2.7 (CALC)
CO2 SERPL-SCNC: 29 MMOL/L (ref 20–32)
COLOR UR: YELLOW
CREAT SERPL-MCNC: 0.85 MG/DL (ref 0.5–1.05)
EGFRCR SERPLBLD CKD-EPI 2021: 76 ML/MIN/1.73M2
EOSINOPHIL # BLD AUTO: 139 CELLS/UL (ref 15–500)
EOSINOPHIL NFR BLD AUTO: 2.4 %
ERYTHROCYTE [DISTWIDTH] IN BLOOD BY AUTOMATED COUNT: 14 % (ref 11–15)
EST. AVERAGE GLUCOSE BLD GHB EST-MCNC: 111 MG/DL
EST. AVERAGE GLUCOSE BLD GHB EST-SCNC: 6.2 MMOL/L
GLUCOSE SERPL-MCNC: 81 MG/DL (ref 65–139)
GLUCOSE UR QL STRIP: NEGATIVE
HBA1C MFR BLD: 5.5 % OF TOTAL HGB
HCT VFR BLD AUTO: 38.1 % (ref 35–45)
HDLC SERPL-MCNC: 63 MG/DL
HGB BLD-MCNC: 12.4 G/DL (ref 11.7–15.5)
HGB UR QL STRIP: NEGATIVE
HYALINE CASTS #/AREA URNS LPF: ABNORMAL /LPF
KETONES UR QL STRIP: NEGATIVE
LDLC SERPL CALC-MCNC: 82 MG/DL (CALC)
LEUKOCYTE ESTERASE UR QL STRIP: ABNORMAL
LYMPHOCYTES # BLD AUTO: 2216 CELLS/UL (ref 850–3900)
LYMPHOCYTES NFR BLD AUTO: 38.2 %
MCH RBC QN AUTO: 27.5 PG (ref 27–33)
MCHC RBC AUTO-ENTMCNC: 32.5 G/DL (ref 32–36)
MCV RBC AUTO: 84.5 FL (ref 80–100)
MONOCYTES # BLD AUTO: 499 CELLS/UL (ref 200–950)
MONOCYTES NFR BLD AUTO: 8.6 %
NEUTROPHILS # BLD AUTO: 2894 CELLS/UL (ref 1500–7800)
NEUTROPHILS NFR BLD AUTO: 49.9 %
NITRITE UR QL STRIP: NEGATIVE
NONHDLC SERPL-MCNC: 105 MG/DL (CALC)
PH UR STRIP: 6.5 [PH] (ref 5–8)
PLATELET # BLD AUTO: 307 THOUSAND/UL (ref 140–400)
PMV BLD REES-ECKER: 11.1 FL (ref 7.5–12.5)
POTASSIUM SERPL-SCNC: 3.9 MMOL/L (ref 3.5–5.3)
PROT SERPL-MCNC: 7.3 G/DL (ref 6.1–8.1)
PROT UR QL STRIP: NEGATIVE
RBC # BLD AUTO: 4.51 MILLION/UL (ref 3.8–5.1)
RBC #/AREA URNS HPF: ABNORMAL /HPF
SERVICE CMNT-IMP: ABNORMAL
SODIUM SERPL-SCNC: 140 MMOL/L (ref 135–146)
SP GR UR STRIP: 1.02 (ref 1–1.03)
SQUAMOUS #/AREA URNS HPF: ABNORMAL /HPF
TRIGL SERPL-MCNC: 132 MG/DL
WBC # BLD AUTO: 5.8 THOUSAND/UL (ref 3.8–10.8)
WBC #/AREA URNS HPF: ABNORMAL /HPF

## 2025-06-26 ENCOUNTER — APPOINTMENT (OUTPATIENT)
Dept: RADIOLOGY | Facility: HOSPITAL | Age: 65
End: 2025-06-26
Payer: COMMERCIAL

## 2025-06-26 ENCOUNTER — HOSPITAL ENCOUNTER (EMERGENCY)
Facility: HOSPITAL | Age: 65
Discharge: HOME | End: 2025-06-26
Payer: COMMERCIAL

## 2025-06-26 VITALS
WEIGHT: 145 LBS | BODY MASS INDEX: 24.75 KG/M2 | RESPIRATION RATE: 16 BRPM | HEIGHT: 64 IN | TEMPERATURE: 97.9 F | SYSTOLIC BLOOD PRESSURE: 133 MMHG | DIASTOLIC BLOOD PRESSURE: 96 MMHG | HEART RATE: 88 BPM | OXYGEN SATURATION: 99 %

## 2025-06-26 DIAGNOSIS — M25.561 ACUTE PAIN OF RIGHT KNEE: Primary | ICD-10-CM

## 2025-06-26 DIAGNOSIS — M25.461 SWELLING OF RIGHT KNEE: ICD-10-CM

## 2025-06-26 PROCEDURE — 73564 X-RAY EXAM KNEE 4 OR MORE: CPT | Mod: RIGHT SIDE | Performed by: RADIOLOGY

## 2025-06-26 PROCEDURE — 73564 X-RAY EXAM KNEE 4 OR MORE: CPT | Mod: RT

## 2025-06-26 PROCEDURE — 99283 EMERGENCY DEPT VISIT LOW MDM: CPT

## 2025-06-26 PROCEDURE — 2500000001 HC RX 250 WO HCPCS SELF ADMINISTERED DRUGS (ALT 637 FOR MEDICARE OP)

## 2025-06-26 RX ORDER — IBUPROFEN 800 MG/1
800 TABLET, FILM COATED ORAL EVERY 8 HOURS PRN
Qty: 30 TABLET | Refills: 0 | Status: SHIPPED | OUTPATIENT
Start: 2025-06-26 | End: 2025-07-06

## 2025-06-26 RX ORDER — DICLOFENAC SODIUM 10 MG/G
4 GEL TOPICAL 3 TIMES DAILY PRN
Qty: 20 G | Refills: 0 | Status: SHIPPED | OUTPATIENT
Start: 2025-06-26

## 2025-06-26 RX ORDER — IBUPROFEN 600 MG/1
600 TABLET, FILM COATED ORAL ONCE
Status: COMPLETED | OUTPATIENT
Start: 2025-06-26 | End: 2025-06-26

## 2025-06-26 RX ORDER — ACETAMINOPHEN 325 MG/1
975 TABLET ORAL ONCE
Status: COMPLETED | OUTPATIENT
Start: 2025-06-26 | End: 2025-06-26

## 2025-06-26 RX ORDER — ACETAMINOPHEN 500 MG
1000 TABLET ORAL EVERY 6 HOURS PRN
Qty: 30 TABLET | Refills: 0 | Status: SHIPPED | OUTPATIENT
Start: 2025-06-26 | End: 2025-07-06

## 2025-06-26 RX ADMIN — IBUPROFEN 600 MG: 600 TABLET ORAL at 09:26

## 2025-06-26 RX ADMIN — ACETAMINOPHEN 975 MG: 325 TABLET ORAL at 09:26

## 2025-06-26 ASSESSMENT — PAIN - FUNCTIONAL ASSESSMENT
PAIN_FUNCTIONAL_ASSESSMENT: 0-10
PAIN_FUNCTIONAL_ASSESSMENT: 0-10

## 2025-06-26 ASSESSMENT — PAIN DESCRIPTION - LOCATION
LOCATION: KNEE
LOCATION: KNEE

## 2025-06-26 ASSESSMENT — PAIN DESCRIPTION - ORIENTATION: ORIENTATION: RIGHT

## 2025-06-26 ASSESSMENT — PAIN SCALES - GENERAL
PAINLEVEL_OUTOF10: 10 - WORST POSSIBLE PAIN
PAINLEVEL_OUTOF10: 10 - WORST POSSIBLE PAIN

## 2025-06-26 NOTE — Clinical Note
Camila Becker was seen and treated in our emergency department on 6/26/2025.  She may return to work on 06/27/2025.       If you have any questions or concerns, please don't hesitate to call.      Olman Pascual PA-C

## 2025-06-26 NOTE — DISCHARGE INSTRUCTIONS
Continue Tylenol and/or ibuprofen and voltaren gel as needed for pain. Recommend RICE therapy-rest, ice 2-3 times per day 20 minutes at a time, compression with knee immobilizer, elevate when seated. Weightbearing as tolerated. Do not use crutches on stairs/steps  Please follow up with  [facility] to ensure symptoms are improving  Referral for orthopedics placed. Please follow up for further evaluation and management. You can stop at the  in waiting room to schedule an appointment before you leave today.  Return to ED for any new or worsening symptoms

## 2025-06-26 NOTE — ED TRIAGE NOTES
Pt presents to the ED from home with c/o R knee swelling. Pt states yesterday she was having pain and limping, but today endorses swelling and unable to bear weight. Pt states she injured her knee about 4 months ago, but nothing recent.

## 2025-06-26 NOTE — ED PROVIDER NOTES
HPI   CC: Knee Pain     Patient is a 65-year-old female with PMH hypertension presenting to the ED with concern for right knee pain and swelling.  Patient states a couple months ago she injured her right knee when she was sitting in a chair that blew out from behind her and she fell on the ground with her knees bending up to her chest.  Patient denies any issues with her knee since then.  Starting yesterday she noticed some slight pain in her right knee whenever she was walking.  When she woke up this morning she noticed that her right knee was swollen and she was unable to bear weight secondary to pain.  Patient states at rest she does not have pain but pain flares up when she goes to bear weight on the right leg.  It is rated 10/10 at its worst.  She denies any numbness, weakness, tingling in her right leg.  She tried taking Tylenol yesterday but fell asleep right after so does not know if it helped her or not.  She has a history of high blood pressure and takes hydrochlorothiazide for it.  Denies any chest pain, shortness of breath, headaches, dizziness.          ROS: 10-point review of systems was performed and is otherwise negative except as noted in HPI.    Limitations to history: N/A  Independent Historians: Self   External Records Reviewed: Outpatient notes in EMR    Past Medical History: Noncontributory except per HPI     Past Surgical History: Noncontributory except per HPI     Family History: Reviewed and noncontributory     Social History:  Denies tobacco. Denies ETOH. Denies illicit drugs.    RX Allergies[1]    Home Meds:   Current Outpatient Medications   Medication Instructions    acetaminophen (TYLENOL) 1,000 mg, oral, Every 6 hours PRN    amLODIPine (NORVASC) 5 mg, oral, Daily    blood pressure test kit-medium kit 1 kit, miscellaneous, Daily    ciclopirox (Penlac) 8 % solution     clobetasol (Temovate) 0.05 % ointment     clobetasol (Temovate) 0.05 % ointment Apply topically to affected areas on  feet 2 times daily for 5 days, then take a break for 2 days.    diclofenac sodium (VOLTAREN) 4 g, Topical, 3 times daily PRN    fluocinonide 0.05 % cream 1 Application    hydroCHLOROthiazide (HYDRODIURIL) 25 mg, oral, Daily    hydrocortisone 2.5 % ointment Hydrocortisone 2.5 % External Ointment   Quantity: 56  Refills: 0        Start : 13-Mar-2017   Active    hydroquinone 4 % cream APPLY TO DARK VIZCARRA TWICE DAILY    hydroquinone 4 % cream Apply topically to dark marks 2 times daily.    ibuprofen 800 mg, oral, Every 8 hours PRN    oxybutynin XL (Ditropan-XL) 10 mg 24 hr tablet 1 tablet, Daily        Physical Exam     ED Triage Vitals [06/26/25 0715]   Temperature Heart Rate Respirations BP   36.6 °C (97.9 °F) 86 16 (!) 140/110      Pulse Ox Temp Source Heart Rate Source Patient Position   100 % Oral -- --      BP Location FiO2 (%)     -- --         Vitals and nursing note reviewed.   Physical Exam  Constitutional:       General: She is not in acute distress.     Appearance: Normal appearance. She is not ill-appearing, toxic-appearing or diaphoretic.   HENT:      Head: Normocephalic and atraumatic.      Mouth/Throat:      Mouth: Mucous membranes are moist.      Pharynx: Oropharynx is clear. No oropharyngeal exudate or posterior oropharyngeal erythema.   Eyes:      General: No scleral icterus.        Right eye: No discharge.         Left eye: No discharge.      Extraocular Movements: Extraocular movements intact.      Conjunctiva/sclera: Conjunctivae normal.      Pupils: Pupils are equal, round, and reactive to light.   Cardiovascular:      Rate and Rhythm: Normal rate and regular rhythm.      Heart sounds: Normal heart sounds. No murmur heard.     No friction rub. No gallop.      Comments: DP and PT 2+ bilaterally  Pulmonary:      Effort: Pulmonary effort is normal. No respiratory distress.      Breath sounds: Normal breath sounds. No stridor. No wheezing, rhonchi or rales.   Abdominal:      General: Abdomen is flat.  Bowel sounds are normal. There is no distension.      Palpations: Abdomen is soft.      Tenderness: There is no abdominal tenderness. There is no right CVA tenderness, left CVA tenderness, guarding or rebound.   Musculoskeletal:         General: Normal range of motion.      Cervical back: Normal range of motion and neck supple. No rigidity or tenderness.      Comments: There is no joint deformity, crepitus, bruising, tenderness, erythema, or warmth. Compartments are soft. ROM full to bilateral hip flexion/extension/adduction/abduction/internal rotation/external rotation, knee flexion/extension, ankle dorsi/plantarflexion/inversion/eversion.    Suprapatellar swelling noted to the right knee.  Negative anterior drawer, posterior drawer, varus stress, valgus stress, Lachman test, Erika test on the right.  Negative Mills test on the right.   Lymphadenopathy:      Cervical: No cervical adenopathy.   Skin:     General: Skin is warm and dry.      Capillary Refill: Capillary refill takes less than 2 seconds.   Neurological:      General: No focal deficit present.      Mental Status: She is alert and oriented to person, place, and time.      Comments: Sensation intact to light touch in right lower extremity.  Ankle dorsi/plantarflexion 5/5 on the right.  Knee flexion/extension 5/5 on the right.   Psychiatric:         Mood and Affect: Mood normal.         Behavior: Behavior normal.         Diagnostic Results      Labs Reviewed - No data to display      XR knee right 4+ views   Final Result   1.No acute osseous abnormality. Moderate to large size   suprapatellar joint effusion.   2.Moderate to severe tricompartmental degenerative change.   Signed by Hilario Carrillo MD          ED Course & MDM   Assessment/Plan:   Medications   acetaminophen (Tylenol) tablet 975 mg (has no administration in time range)   ibuprofen tablet 600 mg (has no administration in time range)      ED Course as of 06/26/25 0916   Thu Jun 26, 2025    0913 Patient is a 65-year-old female presenting to the ED with concern for right knee pain and swelling.  Patient is hypertensive with /110.  Patient is not complaining of chest pain, shortness of breath, palpitations, dizziness, or lightheadedness that would suggest workup for end organ damage is necessary.  Workup for asymptomatic hypertension is not indicated.  Vital signs otherwise stable and patient is nontoxic-appearing.  Patient is neurovascularly intact in the right lower extremity with good pulses and soft compartments.  There is no overlying joint redness or warmth.  Lower suspicion for acute arterial occlusion, compartment syndrome, septic arthritis, crystalline arthropathy.  On exam there is no ligamentous instability.  There is no joint tenderness.  She does have suprapatellar swelling on the right.  X-ray ordered revealed degenerative changes with suprapatellar swelling.  Suspect patient may have a soft tissue injury that may be contributing to swelling.  She was given Tylenol and Motrin in ED and was given a knee immobilizer and crutches.  She was assessed after knee immobilizer placement was neurovascularly intact.  Prescriptions for Tylenol, ibuprofen, and Voltaren cream sent to pharmacy.  Encourage patient for follow-up with  orthopedic injury clinic and orthopedic surgery and referral order was placed.  Patient is appropriate for outpatient management.  Patient understands and is agreeable with plan. Patient told to return to ED for any new or worsening symptoms. [VS]      ED Course User Index  [VS] Olman Pascual PA-C         Diagnoses as of 06/26/25 0916   Acute pain of right knee   Swelling of right knee       ED Prescriptions       Medication Sig Dispense Start Date End Date Auth. Provider    acetaminophen (Tylenol) 500 mg tablet Take 2 tablets (1,000 mg) by mouth every 6 hours if needed for mild pain (1 - 3) for up to 10 days. 30 tablet 6/26/2025 7/6/2025 Olman PRABHAKAR  MELA Pascual    ibuprofen 800 mg tablet Take 1 tablet (800 mg) by mouth every 8 hours if needed for mild pain (1 - 3) for up to 10 days. 30 tablet 6/26/2025 7/6/2025 Olman Pascual PA-C    diclofenac sodium (Voltaren) 1 % gel Apply 4.5 inches (4 g) topically 3 times a day as needed (pain). 20 g 6/26/2025 -- Olman Pascual PA-C            Chronic Medical Conditions Significantly Affecting Care:      Escalation of Care: Appropriate for outpatient management     Counseling: Spoke with the patient and discussed today´s findings, in addition to providing specific details for the plan of care and expected course.  Patient was given the opportunity to ask questions.    Discussed return precautions and importance of follow-up.  Advised to follow-up with  orthopedic injury clinic and orthopedic surgery.  Advised to return to the ED for changing or worsening symptoms, new symptoms, complaint specific precautions, and precautions listed on the discharge paperwork.  Educated on the common potential side effects of medications prescribed.    I advised the patient that the emergency evaluation and treatment provided today doesn't end their need for medical care. It is very important that they follow-up with their primary care provider or other specialist as instructed.    The plan of care was mutually agreed upon with the patient. The patient and/or family were given the opportunity to ask questions. All questions asked today in the ED were answered to the best of my ability with today's information.    I specifically advised the patient to return to the ED for changing or worsening symptoms, worrisome new symptoms, or for any complaint specific precautions listed on the discharge paperwork.    Procedure  Splint Application    Performed by: Olman Pascual PA-C  Authorized by: Olman Pascual PA-C    Consent:     Consent obtained:  Verbal    Consent given by:  Patient    Risks, benefits, and  alternatives were discussed: yes      Risks discussed:  Discoloration, numbness, pain and swelling    Alternatives discussed:  Delayed treatment, no treatment, alternative treatment, observation and referral  Universal protocol:     Procedure explained and questions answered to patient or proxy's satisfaction: yes      Relevant documents present and verified: yes      Imaging studies available: yes      Required blood products, implants, devices, and special equipment available: yes      Site/side marked: yes      Immediately prior to procedure a time out was called: yes      Patient identity confirmed:  Verbally with patient and arm band  Pre-procedure details:     Distal neurologic exam:  Normal    Distal perfusion: distal pulses strong and brisk capillary refill    Procedure details:     Location:  Knee    Knee location:  R knee    Splint type:  Knee immobilizer    Splint applied by::  Nursing staff    Supervision: I personally supervised and inspected the splint/strap which was applied. The extremity is appropriately immobilized. Patient neurovascularly intact before and after the splint application.    Post-procedure details:     Distal neurologic exam:  Normal    Distal perfusion: distal pulses strong and brisk capillary refill      Procedure completion:  Tolerated           [1] No Known Allergies       Olman Pascual PA-C  06/26/25 0916

## 2025-07-01 ENCOUNTER — HOSPITAL ENCOUNTER (OUTPATIENT)
Dept: RADIOLOGY | Facility: HOSPITAL | Age: 65
Discharge: HOME | End: 2025-07-01
Payer: COMMERCIAL

## 2025-07-01 ENCOUNTER — OFFICE VISIT (OUTPATIENT)
Dept: ORTHOPEDIC SURGERY | Facility: HOSPITAL | Age: 65
End: 2025-07-01
Payer: COMMERCIAL

## 2025-07-01 ENCOUNTER — APPOINTMENT (OUTPATIENT)
Dept: SPORTS MEDICINE | Facility: HOSPITAL | Age: 65
End: 2025-07-01
Payer: COMMERCIAL

## 2025-07-01 VITALS — BODY MASS INDEX: 24.75 KG/M2 | WEIGHT: 145 LBS | HEIGHT: 64 IN

## 2025-07-01 DIAGNOSIS — M17.11 ARTHRITIS OF RIGHT KNEE: Primary | ICD-10-CM

## 2025-07-01 DIAGNOSIS — M25.561 RIGHT KNEE PAIN, UNSPECIFIED CHRONICITY: ICD-10-CM

## 2025-07-01 PROCEDURE — 1160F RVW MEDS BY RX/DR IN RCRD: CPT | Performed by: SPECIALIST/TECHNOLOGIST

## 2025-07-01 PROCEDURE — 73560 X-RAY EXAM OF KNEE 1 OR 2: CPT | Mod: RT

## 2025-07-01 PROCEDURE — 1125F AMNT PAIN NOTED PAIN PRSNT: CPT | Performed by: SPECIALIST/TECHNOLOGIST

## 2025-07-01 PROCEDURE — 1159F MED LIST DOCD IN RCRD: CPT | Performed by: SPECIALIST/TECHNOLOGIST

## 2025-07-01 PROCEDURE — 99204 OFFICE O/P NEW MOD 45 MIN: CPT | Performed by: SPECIALIST/TECHNOLOGIST

## 2025-07-01 PROCEDURE — 99213 OFFICE O/P EST LOW 20 MIN: CPT | Performed by: SPECIALIST/TECHNOLOGIST

## 2025-07-01 PROCEDURE — 1036F TOBACCO NON-USER: CPT | Performed by: SPECIALIST/TECHNOLOGIST

## 2025-07-01 PROCEDURE — 3008F BODY MASS INDEX DOCD: CPT | Performed by: SPECIALIST/TECHNOLOGIST

## 2025-07-01 PROCEDURE — 73560 X-RAY EXAM OF KNEE 1 OR 2: CPT | Mod: RIGHT SIDE | Performed by: RADIOLOGY

## 2025-07-01 RX ORDER — MELOXICAM 15 MG/1
15 TABLET ORAL DAILY
Qty: 14 TABLET | Refills: 0 | Status: SHIPPED | OUTPATIENT
Start: 2025-07-01 | End: 2025-07-15

## 2025-07-01 ASSESSMENT — PAIN - FUNCTIONAL ASSESSMENT: PAIN_FUNCTIONAL_ASSESSMENT: 0-10

## 2025-07-01 ASSESSMENT — PAIN SCALES - GENERAL: PAINLEVEL_OUTOF10: 7

## 2025-07-02 NOTE — PROGRESS NOTES
Chief Complaint: Pain of the Right Knee       HPI:  Camila Becker is a 65 y.o. female presenting to the orthopedic walk-in clinic with 1 week of right knee pain of unknown mechanism.  Today she reports a soreness over the posterior aspect of her knee and proximal calf.  Pain is worsened with walking and stairs.  She states that she presented to the emergency room on 6/26/2025 due to increased swelling and pain.  She has been using 800 mg of ibuprofen and Tylenol intermittently since onset of her symptoms.  She states she feels better today than she had previously.  She does report a fall approximately 5 months ago when she fell off a wheeled stool and fell backwards onto her buttocks and her knees were forcefully flexed.  She denies numbness or tingling.  Presents for treatment recommendations.    Objective     ROS:  Constitutional: No fever, no chills, not feeling tired, no recent weight gain and no recent weight loss  ENT: No nosebleeds  Cardiovascular: No chest pain  Respiratory: No shortness of breath and no cough  Gastrointestinal: No abdominal pain, no nausea, no diarrhea, and no vomiting  Musculoskeletal: Positive for right knee pain  Integumentary: No rashes and no skin lesions  Neurological: No headache  Psychiatric: No sleep disturbances no depression  Endocrine: No muscle weakness and no muscle cramps  Hematologic/lymphatic: No swelling glands and no tendency for easy bruising    Medical History[1]     Surgical History[2]     Social Connections: Not on file          Physical Exam:  General appearance: WN, WD female, in no acute distress  Skin: No rashes, lesions or wounds  Head: Normocephalic, no evidence of trauma  Eye: EOMI, conjunctiva clear, no discharge  ENT: Nares patent  Neck: No abnormal contour, tracheal midline  Chest/lungs: No respiratory distress, speaking in complete sentences  Musculoskeletal: Tender to palpation over the medial and lateral joint line.  She is able to perform an  isometric quadriceps and traction and straight leg raise without difficulty or lag.  Knee flexion to 125 degrees bilaterally.  2+ patellofemoral crepitus noted bilaterally.  Stable valgus and varus stress test at 0 and 30 degrees.  Small effusion.  No decreased ROM, muscle wasting, rigidity    Neurological: A&O x3, no focal deficits, intact bilateral LE  Psych: normal affect, mood, appearance      Image Results:  X-rays from the emergency department taken on 6/26/2025 reviewed with the patient and show no fractures dislocations.  Moderate tricompartmental osteoarthritis with some apparent loose bodies.  Sunrise view of the left knee taken on 7/1/2025 is reviewed with the patient in the office and shows no acute fractures or dislocations moderate patellofemoral osteoarthritis.      Assessment/Plan   Encounter Diagnoses:  Right knee pain, unspecified chronicity    Arthritis of right knee    Orders Placed This Encounter    XR knee right 1-2 views    Referral to Physical Therapy    meloxicam (Mobic) 15 mg tablet       The patient and I discussed her clinical presentation and physical exam findings consistent with left knee osteoarthritis.  We discussed her conservative and surgical treatment options.  The only way to properly address the arthritic changes would be a total knee replacement and she is nowhere near that at this point.  In regards to the potential loose bodies that she is feeling and potentially seen on radiographs, we would only address these if it was causing mechanical symptoms.  We agreed upon continued conservative treatment.  We agreed upon meloxicam 15 mg taken daily with food for the next 14 days in addition to two 500 mg extra strength Tylenol 3 times a day as well for the next 14 days.  She will stop the ibuprofen.  We provided her with a referral to physical therapy to work on quadriceps, gluteus and core strength and stability, in office modalities edema control and home exercise program.  We  discussed the use of an intra-articular corticosteroid injection if she continues to not see improvement with the conservative measures.  She will follow-up on an as-needed basis.  She is in agreement the plan.  Questions are answered.    ** This office note was dictated using Dragon voice to text software and was not proofread for spelling or grammatical errors **         [1]   Past Medical History:  Diagnosis Date    Acne 2025    Onychomycosis 03/15/2005    DERMATOPHYTOSIS OF NAIL      Personal history of other diseases of the circulatory system     History of hypertension    Urinary tract infection 2025    UTI (urinary tract infection) 2023   [2]   Past Surgical History:  Procedure Laterality Date    OTHER SURGICAL HISTORY  2022     section

## 2025-07-30 ENCOUNTER — HOSPITAL ENCOUNTER (OUTPATIENT)
Dept: GASTROENTEROLOGY | Facility: HOSPITAL | Age: 65
Discharge: HOME | End: 2025-07-30
Payer: COMMERCIAL

## 2025-07-30 VITALS
BODY MASS INDEX: 24.77 KG/M2 | HEART RATE: 59 BPM | WEIGHT: 145.06 LBS | DIASTOLIC BLOOD PRESSURE: 67 MMHG | RESPIRATION RATE: 16 BRPM | SYSTOLIC BLOOD PRESSURE: 111 MMHG | OXYGEN SATURATION: 100 % | HEIGHT: 64 IN | TEMPERATURE: 97 F

## 2025-07-30 DIAGNOSIS — Z12.12 ENCOUNTER FOR COLORECTAL CANCER SCREENING: Primary | ICD-10-CM

## 2025-07-30 DIAGNOSIS — Z12.11 ENCOUNTER FOR COLORECTAL CANCER SCREENING: Primary | ICD-10-CM

## 2025-07-30 PROCEDURE — 3700000013 HC SEDATION LEVEL 5+ TIME - EACH ADDITIONAL 15 MINUTES

## 2025-07-30 PROCEDURE — 7100000009 HC PHASE TWO TIME - INITIAL BASE CHARGE

## 2025-07-30 PROCEDURE — 2500000004 HC RX 250 GENERAL PHARMACY W/ HCPCS (ALT 636 FOR OP/ED): Performed by: STUDENT IN AN ORGANIZED HEALTH CARE EDUCATION/TRAINING PROGRAM

## 2025-07-30 PROCEDURE — 99152 MOD SED SAME PHYS/QHP 5/>YRS: CPT | Mod: 59

## 2025-07-30 PROCEDURE — 45378 DIAGNOSTIC COLONOSCOPY: CPT | Performed by: INTERNAL MEDICINE

## 2025-07-30 PROCEDURE — 99152 MOD SED SAME PHYS/QHP 5/>YRS: CPT | Performed by: INTERNAL MEDICINE

## 2025-07-30 PROCEDURE — 7100000010 HC PHASE TWO TIME - EACH INCREMENTAL 1 MINUTE

## 2025-07-30 PROCEDURE — 3700000012 HC SEDATION LEVEL 5+ TIME - INITIAL 15 MINUTES 5/> YEARS

## 2025-07-30 PROCEDURE — 99153 MOD SED SAME PHYS/QHP EA: CPT

## 2025-07-30 RX ORDER — MIDAZOLAM HYDROCHLORIDE 2 MG/2ML
INJECTION, SOLUTION INTRAMUSCULAR; INTRAVENOUS AS NEEDED
Status: COMPLETED | OUTPATIENT
Start: 2025-07-30 | End: 2025-07-30

## 2025-07-30 RX ORDER — FENTANYL CITRATE 50 UG/ML
INJECTION, SOLUTION INTRAMUSCULAR; INTRAVENOUS AS NEEDED
Status: COMPLETED | OUTPATIENT
Start: 2025-07-30 | End: 2025-07-30

## 2025-07-30 RX ADMIN — FENTANYL CITRATE 25 MCG: 50 INJECTION, SOLUTION INTRAMUSCULAR; INTRAVENOUS at 09:00

## 2025-07-30 RX ADMIN — MIDAZOLAM HYDROCHLORIDE 1 MG: 1 INJECTION, SOLUTION INTRAMUSCULAR; INTRAVENOUS at 08:43

## 2025-07-30 RX ADMIN — MIDAZOLAM HYDROCHLORIDE 1 MG: 1 INJECTION, SOLUTION INTRAMUSCULAR; INTRAVENOUS at 09:00

## 2025-07-30 RX ADMIN — MIDAZOLAM HYDROCHLORIDE 2 MG: 1 INJECTION, SOLUTION INTRAMUSCULAR; INTRAVENOUS at 08:36

## 2025-07-30 RX ADMIN — FENTANYL CITRATE 25 MCG: 50 INJECTION, SOLUTION INTRAMUSCULAR; INTRAVENOUS at 08:50

## 2025-07-30 RX ADMIN — MIDAZOLAM HYDROCHLORIDE 1 MG: 1 INJECTION, SOLUTION INTRAMUSCULAR; INTRAVENOUS at 08:39

## 2025-07-30 RX ADMIN — FENTANYL CITRATE 25 MCG: 50 INJECTION, SOLUTION INTRAMUSCULAR; INTRAVENOUS at 08:43

## 2025-07-30 RX ADMIN — FENTANYL CITRATE 50 MCG: 50 INJECTION, SOLUTION INTRAMUSCULAR; INTRAVENOUS at 08:36

## 2025-07-30 RX ADMIN — FENTANYL CITRATE 25 MCG: 50 INJECTION, SOLUTION INTRAMUSCULAR; INTRAVENOUS at 08:39

## 2025-07-30 RX ADMIN — MIDAZOLAM HYDROCHLORIDE 1 MG: 1 INJECTION, SOLUTION INTRAMUSCULAR; INTRAVENOUS at 08:50

## 2025-07-30 ASSESSMENT — PAIN SCALES - GENERAL
PAINLEVEL_OUTOF10: 0 - NO PAIN
PAINLEVEL_OUTOF10: 4
PAINLEVEL_OUTOF10: 3
PAINLEVEL_OUTOF10: 3
PAINLEVEL_OUTOF10: 0 - NO PAIN
PAINLEVEL_OUTOF10: 2
PAINLEVEL_OUTOF10: 0 - NO PAIN
PAINLEVEL_OUTOF10: 0 - NO PAIN
PAINLEVEL_OUTOF10: 1
PAINLEVEL_OUTOF10: 0 - NO PAIN
PAINLEVEL_OUTOF10: 3
PAINLEVEL_OUTOF10: 0 - NO PAIN

## 2025-07-30 ASSESSMENT — PAIN - FUNCTIONAL ASSESSMENT
PAIN_FUNCTIONAL_ASSESSMENT: 0-10

## 2025-07-30 ASSESSMENT — COLUMBIA-SUICIDE SEVERITY RATING SCALE - C-SSRS
2. HAVE YOU ACTUALLY HAD ANY THOUGHTS OF KILLING YOURSELF?: NO
1. IN THE PAST MONTH, HAVE YOU WISHED YOU WERE DEAD OR WISHED YOU COULD GO TO SLEEP AND NOT WAKE UP?: NO
6. HAVE YOU EVER DONE ANYTHING, STARTED TO DO ANYTHING, OR PREPARED TO DO ANYTHING TO END YOUR LIFE?: NO

## 2025-07-30 NOTE — H&P
History Of Present Illness  Camila Becker is a 65 y.o. female presenting with for 2nd, average risk screening colonoscopy.    Last colonoscopy over 7 years ago, but report unable to be found. Not on blood thinners. Denies any blood in stool. No chronic diarrhea. No FH of colon cancer.      Past Medical History  Medical History[1]  Surgical History  Surgical History[2]  Social History  She reports that she has never smoked. She has never used smokeless tobacco. She reports current alcohol use. She reports that she does not use drugs.    Family History  Family History[3]     Allergies  Allergies[4]    Review of Systems  A 12 point ROS was performed and is negative except for HPI above.    Pre-sedation Evaluation:  ASA Classification - ASA 2 - Patient with mild systemic disease with no functional limitations  Mallampati Score - II (hard and soft palate, upper portion of tonsils and uvula visible)    Physical Exam  Vitals signs reviewed.    General: not in acute distress  CV: regular rate and rhythm  Resp: clear to auscultation bilaterally  GI: soft, active bowel sounds, non-tender to palpation, no rebound or guarding, no ascites  Msk: no lower extremity edema  Derm: no jaundice      Last Recorded Vitals  There were no vitals taken for this visit.     Assessment/Plan   Camila Becker is a 65 y.o. female presenting with for 2nd, average risk screening colonoscopy.    Last colonoscopy over 7 years ago (was normal), but report unable to be found. Not on blood thinners. Denies any blood in stool. No chronic diarrhea. No FH of colon cancer.     OK to proceed with screening colonoscopy with moderate sedation.     PTA/Current Medications:  Prescriptions Prior to Admission[5]  Current Medications[6]    Maggie Barros MD MPH  GI Fellow  [facility]       [1]   Past Medical History:  Diagnosis Date    Acne 02/21/2025    Anxiety     HTN (hypertension)     Internal hemorrhoid     Mild asthma (HHS-HCC)     OAB (overactive  bladder)     Onychomycosis 03/15/2005    DERMATOPHYTOSIS OF NAIL      Personal history of other diseases of the circulatory system     History of hypertension    Urinary tract infection 2025    UTI (urinary tract infection) 2023   [2]   Past Surgical History:  Procedure Laterality Date     SECTION, CLASSIC      OTHER SURGICAL HISTORY  2022     section   [3]   Family History  Problem Relation Name Age of Onset    Diabetes Father      Hypertension Father      Breast cancer Sister     [4] No Known Allergies  [5] (Not in a hospital admission)  [6]   Current Outpatient Medications   Medication Sig Dispense Refill    amLODIPine (Norvasc) 5 mg tablet Take 1 tablet (5 mg) by mouth once daily. 90 tablet 3    blood pressure test kit-medium kit 1 kit once daily. 1 kit 0    clobetasol (Temovate) 0.05 % ointment       clobetasol (Temovate) 0.05 % ointment Apply topically to affected areas on feet 2 times daily for 5 days, then take a break for 2 days. 60 g 5    diclofenac sodium (Voltaren) 1 % gel Apply 4.5 inches (4 g) topically 3 times a day as needed (pain). 20 g 0    fluocinonide 0.05 % cream 1 Application      hydroCHLOROthiazide (HYDRODiuril) 25 mg tablet Take 1 tablet (25 mg) by mouth once daily. 90 tablet 3    hydrocortisone 2.5 % ointment Hydrocortisone 2.5 % External Ointment   Quantity: 56  Refills: 0        Start : 13-Mar-2017   Active      hydroquinone 4 % cream APPLY TO DARK VIZCARRA TWICE DAILY      hydroquinone 4 % cream Apply topically to dark marks 2 times daily. 28.35 g 5    ciclopirox (Penlac) 8 % solution  (Patient not taking: Reported on 2025)      oxybutynin XL (Ditropan-XL) 10 mg 24 hr tablet Take 1 tablet (10 mg) by mouth once daily. (Patient not taking: Reported on 2025)       No current facility-administered medications for this encounter.

## 2025-07-30 NOTE — DISCHARGE INSTRUCTIONS
Patient Instructions after a Colonoscopy      The anesthetics, sedatives or narcotics which were given to you today will be acting in your body for the next 24 hours, so you might feel a little sleepy or groggy.  This feeling should slowly wear off. Carefully read and follow the instructions.     You received sedation today:  - Do not drive or operate any machinery or power tools of any kind.   - No alcoholic beverages today, not even beer or wine.  - Do not make any important decisions or sign any legal documents.  - No over the counter medications that contain alcohol or that may cause drowsiness.  - Do not make any important decisions or sign any legal documents.  - Make sure you have someone with you for first 24 hours.    While it is common to experience mild to moderate abdominal distention, gas, or belching after your procedure, if any of these symptoms occur following discharge from the GI Lab or within one week of having your procedure, call the [facility] to be advised whether a visit to your nearest Urgent Care or Emergency Department is indicated.  Take this paper with you if you go.     - If you develop an allergic reaction to the medications that were given during your procedure such as difficulty breathing, rash, hives, severe nausea, vomiting or lightheadedness.  - If you experience chest pain, shortness of breath, severe abdominal pain, fevers and chills.  -If you develop signs and symptoms of bleeding such as blood in your spit, if your stools turn black, tarry, or bloody  - If you have not urinated within 8 hours following your procedure.  - If your IV site becomes painful, red, inflamed, or looks infected.    If you received a biopsy/polypectomy/sphincterotomy the following instructions apply below:    __ Do not use Aspirin containing products, non-steroidal medications or anti-coagulants for one week following your procedure. (Examples of these types of medications are: Advil,  Arthrotec, Aleve, Coumadin, Ecotrin, Heparin, Ibuprofen, Indocin, Motrin, Naprosyn, Nuprin, Plavix, Vioxx, and Voltarin, or their generic forms.  This list is not all-inclusive.  Check with your physician or pharmacist before resuming medications.)   __ Eat a soft diet today.  Avoid foods that are poorly digested for the next 24 hours.  These foods would include: nuts, beans, lettuce, red meats, and fried foods. Start with liquids and advance your diet as tolerated, gradually work up to eating solids.   __ Do not have a Barium Study or Enema for one week.    Your physician recommends the additional following instructions:    -You have a contact number available for emergencies. The signs and symptoms of potential delayed complications were discussed with you. You may return to normal activities tomorrow.  -Resume your previous diet.  -Continue your present medications.   -We are waiting for your pathology results.  -Your physician has recommended a repeat colonoscopy (date to be determined after pending pathology results are reviewed) for surveillance based on pathology results.  -The findings and recommendations have been discussed with you.  -The findings and recommendations were discussed with your family.  - Please see Medication Reconciliation Form for new medication/medications prescribed.       If you experience any problems or have any questions following discharge from the GI Lab, please call:    Ric Zavala MD  [phone]    To reach your physician after hours call [phone] and ask for the GI Fellow on call      Nurse Signature                                                                        Date___________________                                                                            Patient/Responsible Party Signature                                        Date___________________

## 2025-07-31 ASSESSMENT — PAIN SCALES - GENERAL: PAINLEVEL_OUTOF10: 0 - NO PAIN

## 2025-07-31 NOTE — ADDENDUM NOTE
Encounter addended by: Flor Gonzalez RN on: 7/31/2025 3:59 PM   Actions taken: Contacts section saved, Flowsheet macro applied, Flowsheet accepted